# Patient Record
Sex: FEMALE | Race: WHITE | Employment: PART TIME | URBAN - METROPOLITAN AREA
[De-identification: names, ages, dates, MRNs, and addresses within clinical notes are randomized per-mention and may not be internally consistent; named-entity substitution may affect disease eponyms.]

---
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
## 2019-03-06 ENCOUNTER — HOSPITAL ENCOUNTER (EMERGENCY)
Age: 19
Discharge: HOME | End: 2019-03-06
Payer: COMMERCIAL

## 2019-03-06 VITALS
WEIGHT: 215.83 LBS | SYSTOLIC BLOOD PRESSURE: 142 MMHG | BODY MASS INDEX: 35.96 KG/M2 | TEMPERATURE: 97.8 F | OXYGEN SATURATION: 100 % | HEIGHT: 65 IN | RESPIRATION RATE: 16 BRPM | HEART RATE: 84 BPM | DIASTOLIC BLOOD PRESSURE: 89 MMHG

## 2019-03-06 DIAGNOSIS — K59.00 CONSTIPATION, UNSPECIFIED CONSTIPATION TYPE: Primary | ICD-10-CM

## 2019-03-06 PROCEDURE — 99283 EMERGENCY DEPT VISIT LOW MDM: CPT

## 2019-03-06 PROCEDURE — 6370000000 HC RX 637 (ALT 250 FOR IP): Performed by: NURSE PRACTITIONER

## 2019-03-06 RX ORDER — SENNA AND DOCUSATE SODIUM 50; 8.6 MG/1; MG/1
1 TABLET, FILM COATED ORAL ONCE
Status: COMPLETED | OUTPATIENT
Start: 2019-03-06 | End: 2019-03-06

## 2019-03-06 RX ORDER — METOCLOPRAMIDE 10 MG/1
10 TABLET ORAL ONCE
Status: COMPLETED | OUTPATIENT
Start: 2019-03-06 | End: 2019-03-06

## 2019-03-06 RX ORDER — ERYTHROMYCIN STEARATE 250 MG
500 TABLET ORAL ONCE
Status: COMPLETED | OUTPATIENT
Start: 2019-03-06 | End: 2019-03-06

## 2019-03-06 RX ORDER — POLYETHYLENE GLYCOL 3350, SODIUM CHLORIDE, POTASSIUM CHLORIDE, SODIUM BICARBONATE, AND SODIUM SULFATE 240; 5.84; 2.98; 6.72; 22.72 G/4L; G/4L; G/4L; G/4L; G/4L
4000 POWDER, FOR SOLUTION ORAL ONCE
Qty: 4000 ML | Refills: 0 | Status: SHIPPED | OUTPATIENT
Start: 2019-03-06 | End: 2019-03-06

## 2019-03-06 RX ADMIN — METOCLOPRAMIDE HYDROCHLORIDE 10 MG: 10 TABLET ORAL at 16:19

## 2019-03-06 RX ADMIN — ERYTHROMYCIN STEARATE 500 MG: 250 TABLET, FILM COATED ORAL at 16:19

## 2019-03-06 RX ADMIN — SENNOSIDES AND DOCUSATE SODIUM 1 TABLET: 8.6; 5 TABLET ORAL at 16:19

## 2019-03-06 RX ADMIN — MAGESIUM CITRATE 296 ML: 1.75 LIQUID ORAL at 16:19

## 2019-03-06 ASSESSMENT — ENCOUNTER SYMPTOMS
NAUSEA: 1
RESPIRATORY NEGATIVE: 1
CONSTIPATION: 1
DIARRHEA: 0
RECTAL PAIN: 0
BLOOD IN STOOL: 0
VOMITING: 0

## 2019-03-06 ASSESSMENT — PAIN SCALES - GENERAL: PAINLEVEL_OUTOF10: 7

## 2019-05-13 LAB
AVERAGE GLUCOSE: NORMAL
HBA1C MFR BLD: 6.9 %

## 2019-06-22 ENCOUNTER — HOSPITAL ENCOUNTER (EMERGENCY)
Age: 19
Discharge: HOME | End: 2019-06-22
Attending: EMERGENCY MEDICINE
Payer: COMMERCIAL

## 2019-06-22 VITALS
RESPIRATION RATE: 14 BRPM | SYSTOLIC BLOOD PRESSURE: 110 MMHG | BODY MASS INDEX: 37.5 KG/M2 | HEART RATE: 96 BPM | HEIGHT: 65 IN | TEMPERATURE: 98.5 F | DIASTOLIC BLOOD PRESSURE: 73 MMHG | WEIGHT: 225.09 LBS | OXYGEN SATURATION: 99 %

## 2019-06-22 DIAGNOSIS — G89.29 ACUTE EXACERBATION OF CHRONIC LOW BACK PAIN: Primary | ICD-10-CM

## 2019-06-22 DIAGNOSIS — M54.50 ACUTE EXACERBATION OF CHRONIC LOW BACK PAIN: Primary | ICD-10-CM

## 2019-06-22 LAB
BILIRUBIN URINE: NEGATIVE
BLOOD, URINE: NEGATIVE
CLARITY: CLEAR
COLOR: YELLOW
GLUCOSE URINE: NEGATIVE MG/DL
HCG(URINE) PREGNANCY TEST: NEGATIVE
KETONES, URINE: NEGATIVE MG/DL
LEUKOCYTE ESTERASE, URINE: NEGATIVE
MICROSCOPIC EXAMINATION: NORMAL
NITRITE, URINE: NEGATIVE
PH UA: 6.5 (ref 5–8)
PROTEIN UA: NEGATIVE MG/DL
SPECIFIC GRAVITY UA: 1.02 (ref 1–1.03)
URINE REFLEX TO CULTURE: NORMAL
URINE TYPE: NORMAL
UROBILINOGEN, URINE: 0.2 E.U./DL

## 2019-06-22 PROCEDURE — 84703 CHORIONIC GONADOTROPIN ASSAY: CPT

## 2019-06-22 PROCEDURE — 99283 EMERGENCY DEPT VISIT LOW MDM: CPT

## 2019-06-22 PROCEDURE — 81003 URINALYSIS AUTO W/O SCOPE: CPT

## 2019-06-22 RX ORDER — NAPROXEN 250 MG/1
250 TABLET ORAL 2 TIMES DAILY PRN
Qty: 20 TABLET | Refills: 0 | Status: SHIPPED | OUTPATIENT
Start: 2019-06-22

## 2019-06-22 ASSESSMENT — PAIN DESCRIPTION - FREQUENCY: FREQUENCY: CONTINUOUS

## 2019-06-22 ASSESSMENT — PAIN DESCRIPTION - DESCRIPTORS: DESCRIPTORS: ACHING

## 2019-06-22 ASSESSMENT — PAIN SCALES - GENERAL
PAINLEVEL_OUTOF10: 6
PAINLEVEL_OUTOF10: 6

## 2019-06-22 ASSESSMENT — PAIN DESCRIPTION - ORIENTATION: ORIENTATION: LOWER

## 2019-06-22 ASSESSMENT — PAIN DESCRIPTION - LOCATION: LOCATION: BACK

## 2019-06-22 ASSESSMENT — PAIN DESCRIPTION - PAIN TYPE: TYPE: ACUTE PAIN

## 2019-06-22 NOTE — ED PROVIDER NOTES
[facility]    [address] [address]  Dept: [phone]  Loc: [address]    Chief Complaint   Patient presents with    Back Pain     lower back pain. states has been ongoing since she was 14. GIANFRANCO Hess is a 22 y.o. female who presents with back pain, localized in the lower region of the back. The onset was 2 days ago. The duration has been constant  since the onset. The quality of the pain is sharp. The pain worsens with movement. The context is this patient states she has a long history of lower back pain. It started when she is 10years old. It comes and goes since then. She states 2 days ago she developed lower back pain. It does not radiate. It is an ache. It is worse with movement. She has not taken anything as of today to control the pain. . Patient denies history of malignancy, IV drug use, or recent surgery. No history of uncontrolled diabetes (she reports sugars running normally), HIV or immunosuppressant therapy. REVIEW OF SYSTEMS    General: No fevers, chills or night sweats, No weight loss    GI: No abdominal pain or vomiting    : No dysuria or hematuria    Musculoskeletal: see HPI, No unrelenting pain or night pain    Neurologic: No bowel or bladder incontinence, No saddle anesthesia, No leg weakness    All other systems reviewed and are negative. PAST MEDICAL & SURGICAL HISTORY    Past Medical History:   Diagnosis Date    Asthma     Diabetes mellitus ([address]     Diabetes mellitus ([address]     type !     Irregular uterine bleeding        Past Surgical History:   Procedure Laterality Date    TONSILLECTOMY AND ADENOIDECTOMY  4/2014       CURRENT MEDICATIONS (may include discharge medications prescribed in the ED)    Current Outpatient Rx   Medication Sig Dispense Refill    naproxen (NAPROSYN) 250 MG tablet Take 1 tablet by mouth 2 times daily as needed for Pain 20 tablet 0    valACYclovir (VALTREX) 500 MG tablet Take 1 tablet by mouth 2 times daily 20 tablet 0    insulin lispro (HUMALOG) 100 UNIT/ML injection vial Inject  into the skin 3 times daily (before meals). Sliding scale         ALLERGIES    Allergies   Allergen Reactions    Sulfa Antibiotics Hives    Amoxicillin     Pcn [Penicillins] Hives     Breathing difficulty    Pcn [Penicillins]        SOCIAL HISTORY    Social History     Socioeconomic History    Marital status: Single     Spouse name: Not on file    Number of children: Not on file    Years of education: Not on file    Highest education level: Not on file   Occupational History    Not on file   Social Needs    Financial resource strain: Not on file    Food insecurity:     Worry: Not on file     Inability: Not on file    Transportation needs:     Medical: Not on file     Non-medical: Not on file   Tobacco Use    Smoking status: Current Every Day Smoker     Packs/day: 1.00     Years: 3.00     Pack years: 3.00    Smokeless tobacco: Never Used   Substance and Sexual Activity    Alcohol use:  Yes    Drug use: No    Sexual activity: Yes     Partners: Female, Male   Lifestyle    Physical activity:     Days per week: Not on file     Minutes per session: Not on file    Stress: Not on file   Relationships    Social connections:     Talks on phone: Not on file     Gets together: Not on file     Attends Restorationism service: Not on file     Active member of club or organization: Not on file     Attends meetings of clubs or organizations: Not on file     Relationship status: Not on file    Intimate partner violence:     Fear of current or ex partner: Not on file     Emotionally abused: Not on file     Physically abused: Not on file     Forced sexual activity: Not on file   Other Topics Concern    Not on file   Social History Narrative    ** Merged History Encounter **            PHYSICAL EXAM    VITAL SIGNS: BP

## 2019-07-19 ENCOUNTER — OFFICE VISIT (OUTPATIENT)
Dept: FAMILY MEDICINE CLINIC | Age: 19
End: 2019-07-19
Payer: COMMERCIAL

## 2019-07-19 VITALS
HEART RATE: 82 BPM | BODY MASS INDEX: 38.24 KG/M2 | DIASTOLIC BLOOD PRESSURE: 80 MMHG | WEIGHT: 224 LBS | RESPIRATION RATE: 16 BRPM | HEIGHT: 64 IN | SYSTOLIC BLOOD PRESSURE: 118 MMHG | OXYGEN SATURATION: 98 %

## 2019-07-19 DIAGNOSIS — Z71.6 TOBACCO ABUSE COUNSELING: ICD-10-CM

## 2019-07-19 DIAGNOSIS — F32.9 MAJOR DEPRESSIVE DISORDER, REMISSION STATUS UNSPECIFIED, UNSPECIFIED WHETHER RECURRENT: ICD-10-CM

## 2019-07-19 DIAGNOSIS — E10.9 TYPE 1 DIABETES MELLITUS WITHOUT COMPLICATION (HCC): ICD-10-CM

## 2019-07-19 DIAGNOSIS — Z76.89 ENCOUNTER TO ESTABLISH CARE: Primary | ICD-10-CM

## 2019-07-19 DIAGNOSIS — M54.50 CHRONIC BILATERAL LOW BACK PAIN WITHOUT SCIATICA: ICD-10-CM

## 2019-07-19 DIAGNOSIS — G89.29 CHRONIC BILATERAL LOW BACK PAIN WITHOUT SCIATICA: ICD-10-CM

## 2019-07-19 LAB
CONTROL: NORMAL
PREGNANCY TEST URINE, POC: NEGATIVE

## 2019-07-19 PROCEDURE — 1036F TOBACCO NON-USER: CPT | Performed by: FAMILY MEDICINE

## 2019-07-19 PROCEDURE — 81025 URINE PREGNANCY TEST: CPT | Performed by: FAMILY MEDICINE

## 2019-07-19 PROCEDURE — 2022F DILAT RTA XM EVC RTNOPTHY: CPT | Performed by: FAMILY MEDICINE

## 2019-07-19 PROCEDURE — 99204 OFFICE O/P NEW MOD 45 MIN: CPT | Performed by: FAMILY MEDICINE

## 2019-07-19 PROCEDURE — G8419 CALC BMI OUT NRM PARAM NOF/U: HCPCS | Performed by: FAMILY MEDICINE

## 2019-07-19 PROCEDURE — G8427 DOCREV CUR MEDS BY ELIG CLIN: HCPCS | Performed by: FAMILY MEDICINE

## 2019-07-19 PROCEDURE — 3046F HEMOGLOBIN A1C LEVEL >9.0%: CPT | Performed by: FAMILY MEDICINE

## 2019-07-19 RX ORDER — IBUPROFEN 600 MG/1
600 TABLET ORAL
COMMUNITY
Start: 2016-04-30

## 2019-07-19 RX ORDER — INSULIN PUMP CONTROLLER
EACH MISCELLANEOUS
COMMUNITY
Start: 2019-07-01

## 2019-07-19 RX ORDER — BLOOD SUGAR DIAGNOSTIC
STRIP MISCELLANEOUS
COMMUNITY
Start: 2019-07-01

## 2019-07-19 RX ORDER — INSULIN PUMP CONTROLLER
EACH MISCELLANEOUS
Refills: 11 | COMMUNITY
Start: 2019-07-10

## 2019-07-19 RX ORDER — NAPROXEN 250 MG/1
250 TABLET ORAL 2 TIMES DAILY WITH MEALS
Qty: 60 TABLET | Refills: 0 | Status: SHIPPED | OUTPATIENT
Start: 2019-07-19

## 2019-07-19 ASSESSMENT — ENCOUNTER SYMPTOMS
RHINORRHEA: 0
FACIAL SWELLING: 0
COUGH: 0
CHEST TIGHTNESS: 0
VOMITING: 0
SINUS PRESSURE: 0
DIARRHEA: 0
TROUBLE SWALLOWING: 0
SHORTNESS OF BREATH: 0
WHEEZING: 0
NAUSEA: 0
ABDOMINAL PAIN: 0
SORE THROAT: 0

## 2019-07-19 ASSESSMENT — PATIENT HEALTH QUESTIONNAIRE - PHQ9
1. LITTLE INTEREST OR PLEASURE IN DOING THINGS: 1
SUM OF ALL RESPONSES TO PHQ QUESTIONS 1-9: 2
SUM OF ALL RESPONSES TO PHQ9 QUESTIONS 1 & 2: 2
2. FEELING DOWN, DEPRESSED OR HOPELESS: 1
SUM OF ALL RESPONSES TO PHQ QUESTIONS 1-9: 2

## 2019-07-19 NOTE — PROGRESS NOTES
valACYclovir (VALTREX) 500 MG tablet Take 1 tablet by mouth 2 times daily Yes Annamarie Purcell MD   insulin lispro (HUMALOG) 100 UNIT/ML injection vial Inject  into the skin 3 times daily (before meals). Sliding scale Yes Historical Provider, MD        Allergies   Allergen Reactions    Sulfa Antibiotics Hives    Amoxicillin     Pcn [Penicillins] Hives     Breathing difficulty    Pcn [Penicillins]        Past Medical History:   Diagnosis Date    Asthma     [facility]     Diabetes mellitus ([facility]     type !  Irregular uterine bleeding        Past Surgical History:   Procedure Laterality Date    TONSILLECTOMY AND ADENOIDECTOMY  2014       Social History     Socioeconomic History    Marital status: Single     Spouse name: Not on file    Number of children: Not on file    Years of education: Not on file    Highest education level: Not on file   Occupational History    Not on file   Social Needs    Financial resource strain: Not on file    Food insecurity:     Worry: Not on file     Inability: Not on file    Transportation needs:     Medical: Not on file     Non-medical: Not on file   Tobacco Use    Smoking status: Former Smoker     Packs/day: 1.00     Years: 3.00     Pack years: 3.00     Types: Cigarettes     Last attempt to quit: 2019     Years since quittin.0    Smokeless tobacco: Never Used   Substance and Sexual Activity    Alcohol use:  Yes     Alcohol/week: 0.0 - 1.0 standard drinks    Drug use: No    Sexual activity: Yes     Partners: Female, Male   Lifestyle    Physical activity:     Days per week: Not on file     Minutes per session: Not on file    Stress: Not on file   Relationships    Social connections:     Talks on phone: Not on file     Gets together: Not on file     Attends Orthodoxy service: Not on file     Active member of club or organization: Not on file     Attends meetings of clubs or organizations: Not on file     Relationship status: Not on file was shown stretching exercises. She will push fluids and rest.   RTC if not improved. - POCT urine pregnancy      Return in about 4 weeks (around 8/16/2019) for follow up on other concerns.

## 2019-07-21 ASSESSMENT — ENCOUNTER SYMPTOMS: BACK PAIN: 1

## 2019-08-16 ENCOUNTER — OFFICE VISIT (OUTPATIENT)
Dept: FAMILY MEDICINE CLINIC | Age: 19
End: 2019-08-16
Payer: COMMERCIAL

## 2019-08-16 ENCOUNTER — TELEPHONE (OUTPATIENT)
Dept: FAMILY MEDICINE CLINIC | Age: 19
End: 2019-08-16

## 2019-08-16 VITALS
BODY MASS INDEX: 37.25 KG/M2 | HEIGHT: 65 IN | SYSTOLIC BLOOD PRESSURE: 127 MMHG | DIASTOLIC BLOOD PRESSURE: 72 MMHG | WEIGHT: 223.6 LBS | HEART RATE: 91 BPM | TEMPERATURE: 97 F

## 2019-08-16 DIAGNOSIS — E13.9 DIABETES MELLITUS DUE TO ABNORMAL INSULIN (HCC): Primary | ICD-10-CM

## 2019-08-16 DIAGNOSIS — Z30.09 FAMILY PLANNING: ICD-10-CM

## 2019-08-16 DIAGNOSIS — N92.6 IRREGULAR UTERINE BLEEDING: ICD-10-CM

## 2019-08-16 LAB
CONTROL: NORMAL
CREATININE URINE POCT: 39.3
HBA1C MFR BLD: 7.7 %
MICROALBUMIN/CREAT 24H UR: <5 MG/G{CREAT}
MICROALBUMIN/CREAT UR-RTO: <12.7
PREGNANCY TEST URINE, POC: POSITIVE

## 2019-08-16 PROCEDURE — 81025 URINE PREGNANCY TEST: CPT | Performed by: FAMILY MEDICINE

## 2019-08-16 PROCEDURE — 83036 HEMOGLOBIN GLYCOSYLATED A1C: CPT | Performed by: FAMILY MEDICINE

## 2019-08-16 PROCEDURE — 1036F TOBACCO NON-USER: CPT | Performed by: FAMILY MEDICINE

## 2019-08-16 PROCEDURE — 99213 OFFICE O/P EST LOW 20 MIN: CPT | Performed by: FAMILY MEDICINE

## 2019-08-16 PROCEDURE — 82044 UR ALBUMIN SEMIQUANTITATIVE: CPT | Performed by: FAMILY MEDICINE

## 2019-08-16 PROCEDURE — 2022F DILAT RTA XM EVC RTNOPTHY: CPT | Performed by: FAMILY MEDICINE

## 2019-08-16 PROCEDURE — G8417 CALC BMI ABV UP PARAM F/U: HCPCS | Performed by: FAMILY MEDICINE

## 2019-08-16 PROCEDURE — G8427 DOCREV CUR MEDS BY ELIG CLIN: HCPCS | Performed by: FAMILY MEDICINE

## 2019-08-16 PROCEDURE — 3044F HG A1C LEVEL LT 7.0%: CPT | Performed by: FAMILY MEDICINE

## 2019-08-16 ASSESSMENT — ENCOUNTER SYMPTOMS
ABDOMINAL PAIN: 0
SINUS PRESSURE: 0
DIARRHEA: 0
NAUSEA: 0
RHINORRHEA: 0
VOMITING: 0
FACIAL SWELLING: 0
WHEEZING: 0
CHEST TIGHTNESS: 0
SORE THROAT: 0
TROUBLE SWALLOWING: 0
SHORTNESS OF BREATH: 0
COUGH: 0

## 2019-08-16 NOTE — PROGRESS NOTES
2019     Raejean Jeans (:  2000) is a 22 y.o. female, here for evaluation of the following medical concerns:    1. DM I- has been well controlled, recently had [facility] change carb ratio to 7, basal changed to 36.9 units a day, still having glucose levels above 200 in the AM but much improved during day. Hasn't seen Endo point, will try to get her in as soon as possible. Still is treated by [facility] until she finds an Endocrinologist.  Wants another referral due to not being able to get into ENDO until Nov.     2.  Family planning- wants to establish with Dr. Esther Conde. , has irregular periods at times. Today, denied chest pain, sob,n, v, or diarrhea. HPI    Review of Systems   Constitutional: Positive for fatigue. Negative for activity change, fever and unexpected weight change. HENT: Negative for congestion, ear pain, facial swelling, mouth sores, rhinorrhea, sinus pressure, sore throat and trouble swallowing. Eyes: Negative for visual disturbance. Respiratory: Negative for cough, chest tightness, shortness of breath and wheezing. Cardiovascular: Negative for chest pain and leg swelling. Gastrointestinal: Negative for abdominal pain, diarrhea, nausea and vomiting. Genitourinary: Negative for flank pain, frequency, hematuria, pelvic pain, urgency, vaginal bleeding, vaginal discharge and vaginal pain. Musculoskeletal: Positive for arthralgias. Skin: Negative for rash. Neurological: Negative for dizziness, tremors, syncope, numbness and headaches. Hematological: Does not bruise/bleed easily. Psychiatric/Behavioral: Negative for suicidal ideas. The patient is not nervous/anxious. Prior to Visit Medications    Medication Sig Taking?  Authorizing Provider   glucose 4 g chewable tablet Use as needed for symptoms consistent with hypoglycemia Yes Historical Provider, MD   blood glucose test strips (CONTOUR NEXT TEST) strip TEST BLOOD GLUCOSE UP TO 8 TIMES DAILY Yes pregnancy  Answered questions  Will rtc in one month for just back pain      Return in about 1 month (around 9/16/2019), or for back pain.

## 2019-08-20 LAB
C. TRACHOMATIS DNA ,URINE: NEGATIVE
N. GONORRHOEAE DNA, URINE: NEGATIVE

## 2019-08-21 ENCOUNTER — TELEPHONE (OUTPATIENT)
Dept: ENDOCRINOLOGY | Age: 19
End: 2019-08-21